# Patient Record
Sex: MALE | Race: WHITE
[De-identification: names, ages, dates, MRNs, and addresses within clinical notes are randomized per-mention and may not be internally consistent; named-entity substitution may affect disease eponyms.]

---

## 2019-08-03 ENCOUNTER — HOSPITAL ENCOUNTER (EMERGENCY)
Dept: HOSPITAL 97 - ER | Age: 54
Discharge: HOME | End: 2019-08-03
Payer: COMMERCIAL

## 2019-08-03 DIAGNOSIS — T63.461A: Primary | ICD-10-CM

## 2019-08-03 DIAGNOSIS — Z88.0: ICD-10-CM

## 2019-08-03 DIAGNOSIS — I10: ICD-10-CM

## 2019-08-03 DIAGNOSIS — L50.9: ICD-10-CM
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
## 2019-08-03 DIAGNOSIS — Z88.6: ICD-10-CM

## 2019-08-03 PROCEDURE — 96374 THER/PROPH/DIAG INJ IV PUSH: CPT

## 2019-08-03 PROCEDURE — 99284 EMERGENCY DEPT VISIT MOD MDM: CPT

## 2019-08-03 PROCEDURE — 96375 TX/PRO/DX INJ NEW DRUG ADDON: CPT

## 2019-08-03 NOTE — EDPHYS
Physician Documentation                                                                           

 [facility]                                                                 

Name: Anoop Silveira Jr                                                                           

Age: 53 yrs                                                                                       

Sex: Male                                                                                         

: 1965                                                                                   

MRN: [id number]                                                                                   

Arrival Date: 2019                                                                          

Time: 10:51                                                                                       

Account#: [id number]                                                                            

Bed 13                                                                                            

Private MD: James Zhang T                                                                        

ED Physician Raj Ramos                                                                         

HPI:                                                                                              

                                                                                             

11:06 This 53 yrs old  Male presents to ER via Ambulatory with complaints of Bee     rn  

      Sting.                                                                                      

11:06 This 53 yrs old  Male presents to ER via Ambulatory with complaints of Wasp    rn  

      Sting.                                                                                      

11:06 The patient presents with itching, rash, chest tightness. Onset: The symptoms/episode   rn  

      began/occurred just prior to arrival. Possible causes: wasp. At home the patient or         

      guardian has treated the symptoms with Benadryl. The patient has experienced similar        

      episodes in the past. Reports allergic to stings, got stung by wasp 20 min PTA, reports     

      rash, itching, and chest tightness, reports epi pen is . .                           

                                                                                                  

Historical:                                                                                       

- Allergies:                                                                                      

16:55 Aspirin;                                                                                sg  

16:55 PENICILLINS;                                                                            sg  

- Home Meds:                                                                                      

16:55 losartan 25 mg Oral tab 1 tab once daily [Active];                                      sg  

- PMHx:                                                                                           

16:55 Asthma; Hypertension;                                                                   sg  

                                                                                                  

- Immunization history:: Adult Immunizations up to date.                                          

- Social history:: Smoking status: .                                                              

- Family history:: not pertinent.                                                                 

- Ebola Screening: : Patient negative for fever greater than or equal to 101.5 degrees            

  Fahrenheit, and additional compatible Ebola Virus Disease symptoms Patient denies               

  exposure to infectious person Patient denies travel to an Ebola-affected area in the            

  21 days before illness onset No symptoms or risks identified at this time.                      

- Hospitalizations: : No recent hospitalization is reported.                                      

                                                                                                  

                                                                                                  

ROS:                                                                                              

11:06 Constitutional: Negative for fever, chills, and weight loss, Eyes: Negative for injury, rn  

      pain, redness, and discharge, Cardiovascular: + chest tightness Respiratory: Negative       

      for cough, wheezing, and pleuritic chest pain, Abdomen/GI: Negative for abdominal pain,     

      nausea, vomiting, diarrhea, and constipation, MS/Extremity: Negative for injury and         

      deformity, Skin: + hives and itching Neuro: Negative for headache, weakness, numbness,      

      tingling, and seizure.                                                                      

                                                                                                  

Exam:                                                                                             

11:06 Constitutional:  This is a well developed, well nourished patient who is awake, alert,  rn  

      and in no acute distress. Head/Face:  Normocephalic, atraumatic. Eyes:  Pupils equal        

      round and reactive to light, extra-ocular motions intact.  Lids and lashes normal.          

      Conjunctiva and sclera are non-icteric and not injected.  Cornea within normal limits.      

      Periorbital areas with no swelling, redness, or edema. ENT:  MMM Respiratory:  No           

      increased work of breathing, no retractions or nasal flaring.  Speaking full sentences      

      Skin:  + diffuse urticaria, no bullae MS/ Extremity:  Pulses equal, no cyanosis.            

      Neurovascular intact.  Full, normal range of motion.  Equal circumference. Neuro:           

      Awake and alert, GCS 15, oriented to person, place, time, and situation.  Cranial           

      nerves II-XII grossly intact.  Motor strength 5/5 in all extremities.  Sensory grossly      

      intact.  Cerebellar exam normal.  Normal gait.                                              

                                                                                                  

Vital Signs:                                                                                      

10:57  / 83; Pulse 90; Resp 18; Temp 97.7(TE); Pulse Ox 96% on R/A; Weight 89.36 kg;    hj  

      Height 6 ft. 2 in. (187.96 cm);                                                             

10:57 Body Mass Index 25.29 (89.36 kg, 187.96 cm)                                             hj  

                                                                                                  

MDM:                                                                                              

11:01 Patient medically screened.                                                             rn  

13:02 Differential diagnosis: angioedema, urticaria. Data reviewed: vital signs, nurses       rn  

      notes, and as a result, I will discharge patient. Counseling: I had a detailed              

      discussion with the patient and/or guardian regarding: the historical points, exam          

      findings, and any diagnostic results supporting the discharge/admit diagnosis, the need     

      for outpatient follow up, to return to the emergency department if symptoms worsen or       

      persist or if there are any questions or concerns that arise at home. Response to           

      treatment: the patient's symptoms have markedly improved after treatment, and as a          

      result, I will discharge patient. Special discussion: I discussed with the                  

      patient/guardian in detail that at this point there is no indication for admission to       

      the hospital. It is understood, however, that if the symptoms persist or worsen the         

      patient needs to return immediately for re-evaluation.                                      

                                                                                                  

                                                                                             

11:06 Order name: IV Start; Complete Time: 11:15                                              rn  

                                                                                                  

Administered Medications:                                                                         

: Drug: SOLU-Medrol 125 mg Route: IVP; Site: right forearm;                               sg  

11:25 Drug: Benadryl 25 mg Route: IVP; Site: right forearm;                                   sg  

11:25 Drug: Pepcid 20 mg Route: IVP; Site: right forearm;                                     sg  

11:25 Drug: Xopenex 1.25 mg Route: Inhalation;                                                sg  

                                                                                                  

                                                                                                  

Disposition:                                                                                      

19 13:03 Discharged to Home. Impression: Toxic effect of venom of wasps, accidental         

  (unintentional), Acute allergic reaction, Urticaria, unspecified.                               

- Condition is Stable.                                                                            

- Discharge Instructions: Hives.                                                                  

- Prescriptions for Prednisone 20 mg Oral Tablet - take 3 tablet by ORAL route once               

  daily for 5 days; 15 tablet. EpiPen 0.3 mg Injection auto- injector - inject 1 pen by           

  INTRAMUSCULAR route one time As needed Inject into the outer portion of the thigh,              

  through clothing if necessary. Indicated in the emergency treatment of allergic                 

  reactions; 1 packet.                                                                            

- Medication Reconciliation Form, Thank You Letter, Antibiotic Education, Prescription            

  Opioid Use form.                                                                                

- Follow up: Private Physician; When: As needed; Reason: Recheck today's complaints,              

  Re-evaluation by your physician.                                                                

- Problem is new.                                                                                 

- Symptoms have improved.                                                                         

                                                                                                  

                                                                                                  

                                                                                                  

Signatures:                                                                                       

Sampson Garcia RN                         RN   sg                                                   

Rja Ramos MD MD   rn                                                   

                                                                                                  

Corrections: (The following items were deleted from the chart)                                    

13:23 13:03 2019 13:03 Discharged to Home. Impression: Toxic effect of venom of wasps,  sg  

      accidental (unintentional); Acute allergic reaction; Urticaria, unspecified. Condition      

      is Stable. Discharge Instructions: Hives. Prescriptions for Prednisone 20 mg Oral           

      Tablet - take 3 tablet by ORAL route once daily for 5 days; 15 tablet, EpiPen 0.3 mg        

      Injection auto-injector - inject 1 pen by INTRAMUSCULAR route one time As needed Inject     

      into the outer portion of the thigh, through clothing if necessary. Indicated in the        

      emergency treatment of allergic reactions; 1 packet. and Forms are Medication               

      Reconciliation Form, Thank You Letter, Antibiotic Education, Prescription Opioid Use.       

      Follow up: Private Physician; When: As needed; Reason: Recheck today's complaints,          

      Re-evaluation by your physician. Problem is new. Symptoms have improved. rn                 

                                                                                                  

**************************************************************************************************

## 2019-08-03 NOTE — ER
Nurse's Notes                                                                                     

 [facility]                                                                 

Name: Anoop Silveira Jr                                                                           

Age: 53 yrs                                                                                       

Sex: Male                                                                                         

: 1965                                                                                   

MRN: [id number]                                                                                   

Arrival Date: 2019                                                                          

Time: 10:51                                                                                       

Account#: [id number]                                                                            

Bed 13                                                                                            

Private MD: James Zhang T                                                                        

Diagnosis: Toxic effect of venom of wasps, accidental (unintentional);Acute allergic              

  reaction;Urticaria, unspecified                                                                 

                                                                                                  

Presentation:                                                                                     

                                                                                             

10:55 Presenting complaint: Patient states: i got sting by a wasp on the L shoulder area and  hj  

      im allergic to it, it happened 20 mins ago, i closed up on my chest before and got          

      itchy; reports SOB;. Transition of care: patient was not received from another setting      

      of care. Onset: The symptoms/episode began/occurred acutely. Onset of symptoms was          

      2019. Risk Assessment: Do you want to hurt yourself or someone else? Patient     

      reports no desire to harm self or others. Initial Sepsis Screen: Does the patient meet      

      any 2 criteria? No. Patient's initial sepsis screen is negative. Does the patient have      

      a suspected source of infection? No. Patient's initial sepsis screen is negative. Care      

      prior to arrival: None.                                                                     

10:55 Method Of Arrival: Ambulatory                                                           hj  

10:55 Acuity: RODRIGUEZ 3                                                                           hj  

10:58 Anaphylaxis evaluation, chest pain.                                                     hj  

                                                                                                  

Historical:                                                                                       

- Allergies:                                                                                      

16:55 Aspirin;                                                                                sg  

16:55 PENICILLINS;                                                                            sg  

- Home Meds:                                                                                      

16:55 losartan 25 mg Oral tab 1 tab once daily [Active];                                      sg  

- PMHx:                                                                                           

16:55 Asthma; Hypertension;                                                                   sg  

                                                                                                  

- Immunization history:: Adult Immunizations up to date.                                          

- Social history:: Smoking status: .                                                              

- Family history:: not pertinent.                                                                 

- Ebola Screening: : Patient negative for fever greater than or equal to 101.5 degrees            

  Fahrenheit, and additional compatible Ebola Virus Disease symptoms Patient denies               

  exposure to infectious person Patient denies travel to an Ebola-affected area in the            

  21 days before illness onset No symptoms or risks identified at this time.                      

- Hospitalizations: : No recent hospitalization is reported.                                      

                                                                                                  

                                                                                                  

Assessment:                                                                                       

11:10 General: Appears in no apparent distress. well groomed, well developed, well nourished, sg  

      Behavior is calm, cooperative, appropriate for age. Pain: Complains of pain in anterior     

      aspect of left shoulder Quality of pain is described as sharp, throbbing, stinging.         

      Neuro: Level of Consciousness is awake, alert, obeys commands, Oriented to person,          

      place, time,  are equal bilaterally Moves all extremities. Full function Gait is       

      steady, Speech is normal, Facial symmetry appears normal. Cardiovascular: Capillary         

      refill is brisk in bilateral fingers Patient's skin is warm and dry. Chest pain is          

      denied. Respiratory: Reports shortness of breath at rest Airway is patent Respiratory       

      effort is even, unlabored, Respiratory pattern is regular, symmetrical, Breath sounds       

      are clear the patient has mild shortness of breath. GI: Abdomen is flat, non-distended,     

      Reports normal bowel habits, tolerance of fluids, tolerance of food, Patient currently      

      denies nausea, pain, vomiting. : No signs and/or symptoms were reported regarding the     

      genitourinary system. EENT: No signs and/or symptoms were reported regarding the EENT       

      system. Derm: Skin is pink, warm \T\ dry. Musculoskeletal: Circulation, motion, and         

      sensation intact. Range of motion: intact in all extremities.                               

                                                                                                  

Vital Signs:                                                                                      

10:57  / 83; Pulse 90; Resp 18; Temp 97.7(TE); Pulse Ox 96% on R/A; Weight 89.36 kg;    hj  

      Height 6 ft. 2 in. (187.96 cm);                                                             

10:57 Body Mass Index 25.29 (89.36 kg, 187.96 cm)                                               

                                                                                                  

ED Course:                                                                                        

10:51 Patient arrived in ED.                                                                  mr  

10:51 James Zhang MD is Private Physician.                                                   mr  

10:57 Triage completed.                                                                       hj  

10:57 Arm band placed on left wrist.                                                          hj  

11:00 Patient has correct armband on for positive identification. Bed in low position. Call   sg  

      light in reach. Side rails up X2. Pulse ox on. NIBP on. Warm blanket given. Head of bed     

      elevated.                                                                                   

11:01 Raj Ramos MD is Attending Physician.                                                rn  

11:14 Sampson Garcia RN is Primary Nurse.                                                       sg  

11:14 Inserted saline lock: 20 gauge in right forearm, using aseptic technique.               ms  

13:20 No provider procedures requiring assistance completed. IV discontinued, intact,         sg  

      bleeding controlled, No redness/swelling at site. Pressure dressing applied.                

                                                                                                  

Administered Medications:                                                                         

11:25 Drug: SOLU-Medrol 125 mg Route: IVP; Site: right forearm;                               sg  

11:25 Drug: Benadryl 25 mg Route: IVP; Site: right forearm;                                     

11:25 Drug: Pepcid 20 mg Route: IVP; Site: right forearm;                                       

11:25 Drug: Xopenex 1.25 mg Route: Inhalation;                                                  

                                                                                                  

                                                                                                  

Outcome:                                                                                          

13:03 Discharge ordered by MD.                                                                rn  

13:20 Discharged to home ambulatory, with family.                                               

13:20 Condition: good                                                                             

13:20 Discharge instructions given to patient, Instructed on discharge instructions, follow       

      up and referral plans. medication usage, safety practices, Demonstrated understanding       

      of instructions, follow-up care, Prescriptions given X 2.                                   

13:23 Patient left the ED.                                                                      

                                                                                                  

Signatures:                                                                                       

Sampson Garcia RN RN sg Rivera, Mary                                 mr Espinoza, Ekaterina                                 ms                                                   

Raj Ramos MD MD rn Joaquin, Henry, RN RN                                                      

                                                                                                  

Corrections: (The following items were deleted from the chart)                                    

10:58 10:57 Pulse 90bpm; Resp 18bpm; Pulse Ox 96% RA; Temp 97.7F Temporal; 89.36 kg; Height 6 hj  

      ft. 2 in.; BMI: 25.2; hj                                                                    

                                                                                                  

**************************************************************************************************

## 2021-07-26 ENCOUNTER — HOSPITAL ENCOUNTER (EMERGENCY)
Dept: HOSPITAL 97 - ER | Age: 56
Discharge: HOME | End: 2021-07-26
Payer: COMMERCIAL

## 2021-07-26 DIAGNOSIS — Z88.0: ICD-10-CM

## 2021-07-26 DIAGNOSIS — Z88.6: ICD-10-CM

## 2021-07-26 DIAGNOSIS — Z91.038: ICD-10-CM

## 2021-07-26 DIAGNOSIS — I10: ICD-10-CM

## 2021-07-26 DIAGNOSIS — T63.461A: Primary | ICD-10-CM

## 2021-07-27 NOTE — ER
Nurse's Notes                                                                                     

 [facility]                                                                 

Name: Anoop Silveira Jr                                                                           

Age: 55 yrs                                                                                       

Sex: Male                                                                                         

: 1965                                                                                   

MRN: [id number]                                                                                   

Arrival Date: 2021                                                                          

Time: 06:47                                                                                       

Account#: [id number]                                                                            

Bed 20                                                                                            

Private MD:                                                                                       

Diagnosis: Insect allergy status;Insect bite (nonvenomous) of other part of head                  

                                                                                                  

Presentation:                                                                                     

                                                                                             

06:55 Chief complaint: Patient states: I was bit by a wasp yesterday and he is itching, and   bb  

      has swelling to throat took benadryl yesterday and this morning. Coronavirus screen: At     

      this time, the client does not indicate any symptoms associated with coronavirus-19.        

      Ebola Screen: No symptoms or risks identified at this time. Initial Sepsis Screen: Does     

      the patient meet any 2 criteria? No. Patient's initial sepsis screen is negative. Does      

      the patient have a suspected source of infection? No. Patient's initial sepsis screen       

      is negative. Risk Assessment: Do you want to hurt yourself or someone else? Patient         

      reports no desire to harm self or others. Onset of symptoms was 2021.              

06:55 Method Of Arrival: Ambulatory                                                           bb  

06:55 Acuity: RODRIGUEZ 3                                                                           bb  

                                                                                                  

Historical:                                                                                       

- Allergies:                                                                                      

08:09 Aspirin;                                                                                ap3 

08:09 PENICILLINS;                                                                            ap3 

- Home Meds:                                                                                      

08:09 losartan 25 mg Oral tab 1 tab once daily [Active];                                      ap3 

- PMHx:                                                                                           

08:09 Asthma; Hypertension;                                                                   ap3 

- PSHx:                                                                                           

08:09 left index finger partial amputation;                                                   ap3 

                                                                                                  

- Immunization history:: Adult Immunizations up to date.                                          

- Social history:: Smoking status: Patient denies any tobacco usage or history of.                

                                                                                                  

                                                                                                  

Screenin:09 Abuse screen: Denies threats or abuse. Nutritional screening: No deficits noted.        ap3 

      Tuberculosis screening: No symptoms or risk factors identified. Fall Risk None              

      identified.                                                                                 

                                                                                                  

Assessment:                                                                                       

07:05 General: Appears in no apparent distress. comfortable, Behavior is calm, cooperative,   ap3 

      appropriate for age. Pain: Complains of pain in neck. Neuro: Level of Consciousness is      

      awake, alert, obeys commands, Oriented to person, place, time, situation, Appropriate       

      for age Moves all extremities. Gait is steady, Speech is normal. Cardiovascular: Denies     

      chest pain. Respiratory: Airway is patent Respiratory effort is even, unlabored,            

      Respiratory pattern is regular, symmetrical, swelling noted on the left side of the         

      throat. Breath sounds are clear bilaterally. GI: No signs and/or symptoms were reported     

      involving the gastrointestinal system. : No signs and/or symptoms were reported           

      regarding the genitourinary system. EENT: Throat is clear Denies difficulty swallowing.     

      Musculoskeletal: Swelling present in left jaw and neck.                                     

                                                                                                  

Vital Signs:                                                                                      

06:55  / 86; Pulse 96; Resp 16 S; Temp 99.9(O); Pulse Ox 98% on R/A; Weight 95.25 kg    bb  

      (R); Height 6 ft. 2 in. (187.96 cm) (R); Pain 0/10;                                         

08:10  / 84; Pulse 83; Resp 17; Pulse Ox 100% on R/A;                                   ap3 

06:55 Body Mass Index 26.96 (95.25 kg, 187.96 cm)                                             bb  

                                                                                                  

ED Course:                                                                                        

06:47 Patient arrived in ED.                                                                  ds1 

06:57 Triage completed.                                                                       bb  

06:57 [facility]                                                        kb  

06:57 Raj Ramos MD is Attending Physician.                                                kb  

07:09 Luis Manuel Stanley, RN is Primary Nurse.                                                     jb4 

07:10 Primary Nurse role handed off by Luis Manuel Stanley, RN                                      ap3 

07:10 Greer White, RN is Primary Nurse.                                                  ap3 

08:09 Arm band placed on right wrist.                                                         ap3 

08:09 Patient has correct armband on for positive identification. Bed in low position. Call   ap3 

      light in reach. Side rails up X 1. Pulse ox on. NIBP on. Door closed. Noise minimized.      

08:45 No provider procedures requiring assistance completed. IV discontinued, intact,         ap3 

      bleeding controlled, No redness/swelling at site. Pressure dressing applied.                

                                                                                                  

Administered Medications:                                                                         

07:28 Drug: Benadryl (diphenhydrAMINE) 12.5 mg Route: IVP; Site: right antecubital;           ap3 

08:45 Follow up: Response: No adverse reaction                                                ap3 

07:29 Drug: SOLU-Medrol (methylPrednisoLONE) 125 mg Route: IVP; Site: right antecubital;      ap3 

07:29 Drug: NS 0.9% 1000 ml Route: IV; Rate: 1000 ml; Site: right antecubital;                ap3 

08:45 Follow up: IV Status: Completed infusion; IV Intake: 1000ml                             ap3 

07:29 Drug: Pepcid (famotidine) 20 mg Route: IVP; Site: right antecubital;                    ap3 

08:46 Follow up: Response: No adverse reaction                                                ap3 

                                                                                                  

                                                                                                  

Intake:                                                                                           

08:45 IV: 1000ml; Total: 1000ml.                                                              ap3 

                                                                                                  

Outcome:                                                                                          

08:23 Discharge ordered by MD. tyler  

08:45 Discharged to home ambulatory.                                                          ap3 

08:45 Condition: good                                                                             

08:45 Discharge instructions given to patient, Instructed on discharge instructions, follow       

      up and referral plans. medication usage, Demonstrated understanding of instructions,        

      follow-up care, medications, Prescriptions given X 2.                                       

08:46 Patient left the ED.                                                                    ap3 

                                                                                                  

Signatures:                                                                                       

Amanda Brar, FNP-C                 FNP-Neris Garcia                                ds1                                                  

Natalie Ramesh, RN                     RN   bb                                                   

Luis Manuel Stanley RN                       RN   jb4                                                  

Greer White RN                    RN   ap3                                                  

                                                                                                  

**************************************************************************************************

## 2021-07-27 NOTE — EDPHYS
Physician Documentation                                                                           

 [facility]                                                                 

Name: Anoop Silveira Jr                                                                           

Age: 55 yrs                                                                                       

Sex: Male                                                                                         

: 1965                                                                                   

MRN: [id number]                                                                                   

Arrival Date: 2021                                                                          

Time: 06:47                                                                                       

Account#: [id number]                                                                            

Bed 20                                                                                            

Private MD:                                                                                       

ED Physician Raj Ramos                                                                         

HPI:                                                                                              

                                                                                             

08:21 This 55 yrs old  Male presents to ER via Ambulatory with complaints of Wasp    kb  

      Sting-Allergic Reaction.                                                                    

08:21 The patient was bitten on the left jaw, by a wasp, while in the garden or yard, at        

      home. Onset: The symptoms/episode began/occurred yesterday, at 13:30. Secondary to the      

      bite the patient reports swelling. Associated signs and symptoms: Pertinent positives:      

      swelling at site. Severity of symptoms: At their worst the symptoms were mild,              

      moderate, in the emergency department the symptoms are unchanged. The patient has           

      experienced a previous episode. The patient has not recently seen a physician. Patient      

      reports he was stung by wasp in the yard yesterday around 1:30 PM. Patient had              

      localized swelling where he was stung on the left jaw, took 4 Benadryl yesterday. Today     

      woke up with swelling to left jaw that moves down neck and itching..                        

                                                                                                  

Historical:                                                                                       

- Allergies:                                                                                      

08:09 Aspirin;                                                                                ap3 

08:09 PENICILLINS;                                                                            ap3 

- Home Meds:                                                                                      

08:09 losartan 25 mg Oral tab 1 tab once daily [Active];                                      ap3 

- PMHx:                                                                                           

08:09 Asthma; Hypertension;                                                                   ap3 

- PSHx:                                                                                           

08:09 left index finger partial amputation;                                                   ap3 

                                                                                                  

- Immunization history:: Adult Immunizations up to date.                                          

- Social history:: Smoking status: Patient denies any tobacco usage or history of.                

                                                                                                  

                                                                                                  

ROS:                                                                                              

08:20 Constitutional: Negative for fever, chills, and weight loss.                            kb  

08:20 Skin: Positive for swelling, of the left jaw and neck.                                      

08:20 All other systems are negative.                                                             

                                                                                                  

Exam:                                                                                             

08:20 Constitutional:  This is a well developed, well nourished patient who is awake, alert,  kb  

      and in no acute distress. Head/Face:  Normocephalic, atraumatic. ENT:  Moist Mucous         

      membranes Cardiovascular:  Regular rate and rhythm with a normal S1 and S2.  No             

      gallops, murmurs, or rubs.  No pulse deficits. Respiratory:  Respirations even and          

      unlabored. No increased work of breathing, no retractions or nasal flaring. MS/             

      Extremity:  Pulses equal, no cyanosis.  Neurovascular intact.  Full, normal range of        

      motion. Neuro:  Awake and alert, GCS 15, oriented to person, place, time, and               

      situation. Moves all extremities. Normal gait. Psych:  Awake, alert, with orientation       

      to person, place and time.  Behavior, mood, and affect are within normal limits.            

08:20 Skin: Appearance: normal except for affected area, swelling, noted on the left jaw and      

      neck, that are mild.                                                                        

                                                                                                  

Vital Signs:                                                                                      

06:55  / 86; Pulse 96; Resp 16 S; Temp 99.9(O); Pulse Ox 98% on R/A; Weight 95.25 kg    bb  

      (R); Height 6 ft. 2 in. (187.96 cm) (R); Pain 0/10;                                         

08:10  / 84; Pulse 83; Resp 17; Pulse Ox 100% on R/A;                                   ap3 

06:55 Body Mass Index 26.96 (95.25 kg, 187.96 cm)                                             bb  

                                                                                                  

MDM:                                                                                              

06:57 Patient medically screened.                                                             kb  

08:20 Data reviewed: vital signs, nurses notes. Data interpreted: Pulse oximetry: on room air kb  

      is 100 %. Interpretation: normal. Counseling: I had a detailed discussion with the          

      patient and/or guardian regarding: the historical points, exam findings, and any            

      diagnostic results supporting the discharge/admit diagnosis, the need for outpatient        

      follow up, a family practitioner, to return to the emergency department if symptoms         

      worsen or persist or if there are any questions or concerns that arise at home.             

      Response to treatment: the patient's symptoms have resolved after treatment.                

                                                                                                  

                                                                                             

06:58 Order name: IV Start; Complete Time: 07:09                                              kb  

                                                                                                  

Administered Medications:                                                                         

07:28 Drug: Benadryl (diphenhydrAMINE) 12.5 mg Route: IVP; Site: right antecubital;           ap3 

08:45 Follow up: Response: No adverse reaction                                                ap3 

07:29 Drug: SOLU-Medrol (methylPrednisoLONE) 125 mg Route: IVP; Site: right antecubital;      ap3 

07:29 Drug: NS 0.9% 1000 ml Route: IV; Rate: 1000 ml; Site: right antecubital;                ap3 

08:45 Follow up: IV Status: Completed infusion; IV Intake: 1000ml                             ap3 

07:29 Drug: Pepcid (famotidine) 20 mg Route: IVP; Site: right antecubital;                    ap3 

08:46 Follow up: Response: No adverse reaction                                                ap3 

                                                                                                  

                                                                                                  

Disposition:                                                                                      

11:49 Co-signature as Attending Physician, Raj Ramos MD.                                    rn  

                                                                                                  

Disposition Summary:                                                                              

21 08:23                                                                                    

Discharge Ordered                                                                                 

      Location: Home                                                                          kb  

      Condition: Stable                                                                       kb  

      Diagnosis                                                                                   

        - Insect allergy status                                                               kb  

        - Insect bite (nonvenomous) of other part of head                                     kb  

      Followup:                                                                               kb  

        - With: Emergency Department                                                               

        - When: As needed                                                                          

        - Reason: Worsening of condition                                                           

      Followup:                                                                               kb  

        - With: Private Physician                                                                  

        - When: 2 - 3 days                                                                         

        - Reason: Recheck today's complaints, Continuance of care, Re-evaluation by your           

      physician                                                                                   

      Discharge Instructions:                                                                     

        - Discharge Summary Sheet                                                             kb  

        - Bee, Wasp, or Hornet Sting, Adult                                                   kb  

      Forms:                                                                                      

        - Medication Reconciliation Form                                                      kb  

        - Thank You Letter                                                                    kb  

        - Antibiotic Education                                                                kb  

        - Prescription Opioid Use                                                             kb  

      Prescriptions:                                                                              

        - Pepcid 20 mg Oral Tablet                                                                 

            - take 1 tablet by ORAL route every 12 hours for 5 days; 10 tablet; Refills: 0,   kb  

      Product Selection Permitted                                                                 

        - Prednisone 20 mg Oral Tablet                                                             

            - take 1 tablet by ORAL route once daily for 5 days; 5 tablet; Refills: 0,        kb  

      Product Selection Permitted                                                                 

Signatures:                                                                                       

Amanda Brar FNP-C FNP-Raj Johnson MD MD rn Prokisch, Amanda, RN                    RN   ap3                                                  

                                                                                                  

**************************************************************************************************

## 2021-07-28 VITALS — TEMPERATURE: 99.9 F

## 2021-07-28 VITALS — OXYGEN SATURATION: 100 % | DIASTOLIC BLOOD PRESSURE: 84 MMHG | SYSTOLIC BLOOD PRESSURE: 123 MMHG

## 2022-05-13 ENCOUNTER — HOSPITAL ENCOUNTER (EMERGENCY)
Dept: HOSPITAL 97 - ER | Age: 57
Discharge: HOME | End: 2022-05-13
Payer: COMMERCIAL

## 2022-05-13 DIAGNOSIS — Z20.822: ICD-10-CM

## 2022-05-13 DIAGNOSIS — J45.909: ICD-10-CM

## 2022-05-13 DIAGNOSIS — Z88.6: ICD-10-CM

## 2022-05-13 DIAGNOSIS — Z88.0: ICD-10-CM

## 2022-05-13 DIAGNOSIS — I10: ICD-10-CM

## 2022-05-13 DIAGNOSIS — J20.9: Primary | ICD-10-CM

## 2022-05-13 LAB
ALBUMIN SERPL BCP-MCNC: 3.2 G/DL (ref 3.4–5)
ALP SERPL-CCNC: 75 U/L (ref 45–117)
ALT SERPL W P-5'-P-CCNC: 144 U/L (ref 12–78)
AST SERPL W P-5'-P-CCNC: 96 U/L (ref 15–37)
BUN BLD-MCNC: 13 MG/DL (ref 7–18)
GLUCOSE SERPLBLD-MCNC: 99 MG/DL (ref 74–106)
HCT VFR BLD CALC: 42.7 % (ref 39.6–49)
INR BLD: 1.13
LYMPHOCYTES # SPEC AUTO: 0.6 K/UL (ref 0.7–4.9)
MAGNESIUM SERPL-MCNC: 1.7 MG/DL (ref 1.8–2.4)
NT-PROBNP SERPL-MCNC: 17 PG/ML (ref ?–125)
PMV BLD: 7.4 FL (ref 7.6–11.3)
POTASSIUM SERPL-SCNC: 3.7 MMOL/L (ref 3.5–5.1)
RBC # BLD: 4.26 M/UL (ref 4.33–5.43)
TROPONIN I SERPL HS-MCNC: 5 PG/ML (ref ?–58.9)

## 2022-05-13 PROCEDURE — 87804 INFLUENZA ASSAY W/OPTIC: CPT

## 2022-05-13 PROCEDURE — 93005 ELECTROCARDIOGRAM TRACING: CPT

## 2022-05-13 PROCEDURE — 76705 ECHO EXAM OF ABDOMEN: CPT

## 2022-05-13 PROCEDURE — 36415 COLL VENOUS BLD VENIPUNCTURE: CPT

## 2022-05-13 PROCEDURE — 80048 BASIC METABOLIC PNL TOTAL CA: CPT

## 2022-05-13 PROCEDURE — 96361 HYDRATE IV INFUSION ADD-ON: CPT

## 2022-05-13 PROCEDURE — 84484 ASSAY OF TROPONIN QUANT: CPT

## 2022-05-13 PROCEDURE — 85025 COMPLETE CBC W/AUTO DIFF WBC: CPT

## 2022-05-13 PROCEDURE — 80076 HEPATIC FUNCTION PANEL: CPT

## 2022-05-13 PROCEDURE — 99285 EMERGENCY DEPT VISIT HI MDM: CPT

## 2022-05-13 PROCEDURE — 96365 THER/PROPH/DIAG IV INF INIT: CPT

## 2022-05-13 PROCEDURE — 96375 TX/PRO/DX INJ NEW DRUG ADDON: CPT

## 2022-05-13 PROCEDURE — 83880 ASSAY OF NATRIURETIC PEPTIDE: CPT

## 2022-05-13 PROCEDURE — 71275 CT ANGIOGRAPHY CHEST: CPT

## 2022-05-13 PROCEDURE — 83735 ASSAY OF MAGNESIUM: CPT

## 2022-05-13 PROCEDURE — 85379 FIBRIN DEGRADATION QUANT: CPT

## 2022-05-13 PROCEDURE — 71045 X-RAY EXAM CHEST 1 VIEW: CPT

## 2022-05-13 PROCEDURE — 85610 PROTHROMBIN TIME: CPT

## 2022-05-13 NOTE — RAD REPORT
EXAM DESCRIPTION:  CT - Chest For Pe Angio - 5/13/2022 8:54 pm

 

CLINICAL HISTORY:  Chest pain.

shortness of breath

 

COMPARISON:  Thorax W/ Con dated 3/1/2022

 

TECHNIQUE:  CT angiogram of the pulmonary arteries was performed with MIP.

 

All CT scans are performed using dose optimization technique as appropriate and may include automated
 exposure control or mA/KV adjustment according to patient size.

 

FINDINGS:  No evidence of pulmonary thromboembolism.

 

No acute aortic finding demonstrated.

 

Linear atelectasis is present in the posterior lung bases. The lungs are otherwise clear.

 

No significant pericardial or pleural fluid.

 

No concerning bony finding.

 

IMPRESSION:  No evidence of pulmonary thromboembolism.

 

Atelectasis is present both lung bases.

## 2022-05-13 NOTE — XMS REPORT
Clinical Summary

                             Created on:May 13, 2022



Patient:Anoop Silveira Jr.

Sex:Male

:1965

External Reference #:[id number]





Demographics







                          Address                   [address]



                                                    [place] [address]

 

                          Home Phone                [phone]

 

                          Mobile Phone              [phone]

 

                          Email Address             [email or web address]

 

                          Preferred Language        English

 

                          Marital Status            

 

                          [employer] Affiliation     Unknown

 

                          Race                      White

 

                          Ethnic Group              Not  or 









Author







                          Organization              [facility]

[facility]

 

                          Address                   [address] [place] [address]









Support







                Name            Relationship    Address         Phone

 

                Lisbeth Silveira Unavailable     [address]    [phone]

[address] [place] [address] 









Care Team Providers







                    Name                Role                Phone

 

                    VARUN Bernard MD       Primary Care Provider [phone]









Allergies







             Active Allergy Reactions    Severity     Noted Date   Comments

 

             Amoxicillin  Anaphylaxis  High         2019   

 

             Aspirin      Anaphylaxis  High         2018   

 

             Penicillins  Anaphylaxis  High         2019   

 

             Tramadol     Itching                   2019   







Medications







          Medication Sig       Dispensed Refills   Start Date End Date  Status

 

          losartan (COZAAR) 100 Take 100 mg by           0                      

       Active



          mg tablet mouth daily.                                         

 

          zolpidem (AMBIEN) 10 mg Take 12.5 mg by           0                   

          Active



          tablet    mouth at bedtime.                                         



                                                                      

 

          mometasone-formoterol Inhale 200 mg by           0                    

         Active



          (DULERA) 200-5 mouth twice                                         



          mcg/actuation HFAA daily.                                            

 

          multivitamin-iron- Chew 1 tablet           0                     

        Active



          als-folic acid chewable daily.                                        

    



          tablet 3,500 units-18                                                 

  



          mg-0.4 mg                                                   

 

          doxycycline Take 100 mg by           0                             Act

chase



          (VIBRAMYCIN) 150 mg mouth daily.                                      

   



          tablet                                                      

 

          sildenafil (REVATIO) 20 Take by mouth.           0                    

         Active



          mg tablet                                                   







Active Problems







                          Problem                   Noted Date

 

                          Adenocarcinoma of prostate 2019









                                        Overview: 



Formatting of this note might be different from the original.



                                        Added automatically from request for jaja redman [id number]









                          Prostate cancer           2018









                                        Cancer Staging: Clinical stage from : Stage IIB (cT1c, cN0, cM0, PSA: 

8.4, Grade Group: 2) - Signed by Bessy Zapata NP on 2019

 

                                        Overview: 



Formatting of this note might be different from the original.



                                        Mandatory CMS ICD-10  UPDATE



                                        



Last Assessment & Plan: 



Formatting of this note might be different from the original.



                                        Dr. Bernard recommends transperineal biops

y, given the elevation in PSA and that 

he has not had a confirmatory biopsy here at [facility]. I will send a [facility] 
message to the patient as we had discussed in his appointment.









                          Elevated prostate specific antigen (PSA) 2014









                                        Last Assessment & Plan: 



Formatting of this note might be different from the original.



                                         PSA today is increased to 10.3 ng/mL.







Encounters







             Date         Type         Specialty    Care Team    Description

 

             2021   Telephone    Urology      Erica Evangelista, RN 



after 2021



Surgical History







                Surgery         Date            Site/Laterality Comments

 

                UMBILICAL HERNIA REPAIR                                 

 

                COLONOSCOPY W/ POLYPECTOMY                                 3 yea

rs ago

 

                OH BIOPSY OF    2019       Perineum/N/A    Procedure: ENOVA

RE;



                PROSTATE,NEEDLE,TRANSPERINEAL                                 TR

ANSPERINEAL NEEDLE BIOPSY OF



                                                                PROSTATE AND SAT

URATION



                                                                SAMPLING USING S

TEREOTACTIC



                                                                TEMPLATE GUIDKT

E;  Surgeon:



                                                                MEREDITH Lee;  Location:



                                                                [facility];  Health system

e: UROLOGY

 

                OH              2019       Abdomen/N/A     Procedure: SP RO

BOTIC ASSISTED



                LAP,PROSTATECTOMY,RADICAL,W/N                                 OH

OSTATECTOMY, RETROPUBIC



                ERVE SPARE,INCL ROBOTIC                                 RADICAL,

 INCLUDING NERVE



                                                                SPARING;  Surgeo

n: Tye Bernard MD;  Loca

tion: MAIN OR;



                                                                 Service: UROLOG

Y







Medical History







                    Medical History     Date                Comments

 

                    Hypertension                            

 

                    Uncomplicated asthma                     

 

                    Prostate cancer     2018           







Family History







                Medical History Relation        Name            Comments

 

                -Genitourinary (Bladder, Maternal Grandfather Grandfather     



                Kidney, Prostate,                                 



                Testicle)                                       

 

                Coronary heart disease Maternal Grandfather Grandfather     



                (CHD)                                           

 

                Prostate cancer Maternal Grandfather Grandfather     lived to be

 60 or



                                                                70, unsure if he



                                                                 from prosta

te



                                                                cancer or other



                                                                causes

 

                Diabetes        Mother          Mother          

 

                Hypertension    Mother          Mother          

 

                Stroke          Mother          Mother          

 

                -Gastrointestinal Paternal Grandmother Great Grandmother 



                (Esophagus, Liver, Bile                                 



                Duct, Stomach, Pancreas,                                 



                Colon, Rectum, Anus                                 









                Relation        Name            Status          Comments

 

                Maternal Grandfather Grandfather                     

 

                Mother          Mother                          

 

                Paternal Grandmother Great Grandmother                 







Social History







             Tobacco Use  Types        Packs/Day    Years Used   Date

 

             Never Smoker                                        









                Smokeless Tobacco: Never Used                                 









                    Alcohol Use         Standard Drinks/Week Comments

 

                    Yes                 0 (1 standard drink = 0.6 oz pure alcoho

l) 









                          Sex Assigned at Birth     Date Recorded

 

                          Not on file               









                    Job Start Date      Occupation          Industry

 

                    Not on file         Not on file         Not on file







Obstetrics History





Last Filed Vital Signs

Not on file



Plan of Treatment







                Health Maintenance Due Date        Last Done       Comments

 

                COVID-19 Vaccination (2 - Pfizer risk 4-dose series) 04/10/2021 

     2021      







Results

Not on fileafter 2021



Insurance







          Payer     Benefit Plan / Subscriber ID Effective Dates Phone     Addre

ss   Type



                    Group                                             

 

           [employer] [place] PPO POS [id number] 2015-Present            P

[id number]



                                        PPO



          [place], [place] 



                                                            [id number]     









           Guarantor Name Account Type Relation to Date of    Phone      Billing



                                 Patient    Birth                 Address

 

           Anoop Silveira Personal/Family Self       1965 [phone]

[address]                                         (Home)     [place] [address]

 

           Anoop Silveira Personal/Family Self       1965 [phone]

[address]                                         (Home)     [place] [address]

 

           Anoop Silveira Personal/Family Self       1965 [phone]

[address]                                         (Home)     [place] [address]







Advance Directives







                Code Status     Date Activated  Date Inactivated Comments

 

                Full Code       2019  9:16 PM 2019  2:10 PM 







Care Teams







             Team Member  Relationship Specialty    Start Date   End Date

 

                                        Tye Bernard MD



                PCP - General   Urology         8/10/18         



                                        [address] [place] [id number]



                                                                



                                        [phone] (Work)



                                                                



             [phone] (Fax)

## 2022-05-13 NOTE — EDPHYS
Physician Documentation                                                                           

 [facility]                                                                 

Name: Anoop Silveira Jr                                                                           

Age: 56 yrs                                                                                       

Sex: Male                                                                                         

: 1965                                                                                   

MRN: [id number]                                                                                   

Arrival Date: 2022                                                                          

Time: 18:56                                                                                       

Account#: [id number]                                                                            

Bed 15                                                                                            

Private MD:                                                                                       

ED Physician Rod Ascencio                                                                       

HPI:                                                                                              

                                                                                             

19:30 This 56 yrs old Male presents to ER via Ambulatory with complaints of Shortness Of      cp  

      Breath, Back Pain.                                                                          

19:30 The patient has shortness of breath at rest.                                            cp  

19:30 Onset: The symptoms/episode began/occurred 2 week(s) ago.                               cp  

19:30 Duration: The symptoms are continuous, and are steadily getting worse. Associated signs cp  

      and symptoms: Pertinent positives: non-productive cough, shortness of breath, Pertinent     

      negatives: chest pain, fever.                                                               

19:30 Severity of symptoms: in the emergency department the symptoms are unchanged despite    cp  

      home interventions.                                                                         

                                                                                                  

Historical:                                                                                       

- Allergies:                                                                                      

19:05 Aspirin;                                                                                vg1 

19:05 PENICILLINS;                                                                            vg1 

- Home Meds:                                                                                      

19:05 losartan 25 mg Oral tab 1 tab once daily [Active]; Ambian [Active];                     vg1 

- PMHx:                                                                                           

19:05 Asthma; Hypertension; Bronchitis;                                                       vg1 

- PSHx:                                                                                           

19:05 left index finger partial amputation;                                                   vg1 

                                                                                                  

- Immunization history:: Client reports receiving the 2nd dose of the Covid vaccine.              

- Social history:: Smoking status: Patient denies any tobacco usage or history of.                

                                                                                                  

                                                                                                  

ROS:                                                                                              

19:35 Constitutional: Negative for fever, poor PO intake.                                     cp  

19:35 Cardiovascular: Negative for chest pain, edema, palpitations.                           cp  

19:35 Respiratory: Positive for cough, with no reported sputum, shortness of breath, at rest.     

      Negative for wheezing.                                                                      

19:35 Abdomen/GI: Negative for abdominal pain, vomiting, diarrhea, constipation.                  

19:35 Eyes: Negative for injury, pain, redness, and discharge.                                cp  

19:35 ENT: Negative for drainage from ear(s), ear pain, sore throat, difficulty swallowing,       

      difficulty handling secretions.                                                             

19:35 : Negative for urinary symptoms.                                                          

19:35 Skin: Negative for rash.                                                                    

19:35 Neuro: Negative for altered mental status, dizziness, headache, syncope, weakness.          

19:35 All other systems are negative.                                                             

                                                                                                  

Exam:                                                                                             

19:40 Constitutional: The patient appears in no acute distress, alert, awake,                 cp  

      non-diaphoretic, non-toxic, well developed, well nourished.                                 

19:40 Head/Face:  Normocephalic, atraumatic.                                                  cp  

19:40 Eyes: Periorbital structures: appear normal, Conjunctiva: normal, no exudate, no            

      injection, Sclera: no appreciated abnormality, Lids and lashes: appear normal,              

      bilaterally.                                                                                

19:40 ENT: External ear(s): are unremarkable, Nose: is normal, Mouth: Lips: moist, Oral           

      mucosa: pink and intact, moist, Posterior pharynx: Airway: no evidence of obstruction,      

      patent.                                                                                     

19:40 Neck: ROM/movement: is normal, is supple, without pain, no range of motions                 

      limitations, no meningismus.                                                                

19:40 Chest/axilla: Inspection: normal, Palpation: is normal, no crepitus, no tenderness.         

19:40 Cardiovascular: Rate: tachycardic, Rhythm: regular, Edema: is not appreciated, JVD: is      

      not appreciated.                                                                            

19:40 Respiratory: the patient does not display signs of respiratory distress,  Respirations:     

      labored breathing, that is mild, intercostal retractions, are absent, Breath sounds:        

      decreased breath sounds, that are mild, diffuse, stridor, is not appreciated, wheezing:     

      that is mild, is heard diffusely.                                                           

19:40 Abdomen/GI: Inspection: abdomen appears normal, Bowel sounds: active, all quadrants,        

      Palpation: abdomen is soft and non-tender, in all quadrants.                                

19:40 Back: pain, is absent, ROM is normal.                                                       

19:40 Neuro: Orientation: to person, place \T\ time. Mentation: is normal, Motor: moves all       

      fours, strength is normal, Sensation: is normal.                                            

19:48 ECG was reviewed by the Attending Physician.                                            cp  

                                                                                                  

Vital Signs:                                                                                      

19:03  / 81; Pulse 120; Resp 24; Temp 98.5(O); Pulse Ox 98% on R/A; Weight 99.79 kg;    vg1 

      Height 6 ft. 2 in. (187.96 cm); Pain 3/10;                                                  

20:00  / 79; Pulse 101; Resp 20; Pulse Ox 99% on R/A;                                   jb4 

21:15  / 67; Pulse 111; Resp 19; Pulse Ox 96% on R/A;                                   jb4 

19:03 Body Mass Index 28.25 (99.79 kg, 187.96 cm)                                             vg1 

                                                                                                  

MDM:                                                                                              

19:20 Patient medically screened.                                                             cp  

20:00 Differential diagnosis: Bronchitis CHF exacerbation, Myocardial Infarction pneumonia,   cp  

      Pneumothorax pulmonary edema, Pulmonary Embolism Unstable Angina.                           

22:10 Data reviewed: vital signs, nurses notes, lab test result(s), EKG, radiologic studies,  cp  

      CT scan, plain films, ultrasound.                                                           

22:10 Test interpretation: by ED physician or midlevel provider: ECG, plain radiologic        cp  

      studies. Counseling: I had a detailed discussion with the patient and/or guardian           

      regarding: the historical points, exam findings, and any diagnostic results supporting      

      the discharge/admit diagnosis, lab results, radiology results, the need for outpatient      

      follow up, a family practitioner, to return to the emergency department if symptoms         

      worsen or persist or if there are any questions or concerns that arise at home.             

      Response to treatment: the patient's symptoms have markedly improved after treatment,       

      and as a result, I will discharge patient.                                                  

                                                                                                  

                                                                                             

19:27 Order name: Basic Metabolic Panel; Complete Time: 20:36                                 cp  

                                                                                             

21:25 Interpretation: Normal except: .                                                  cp  

                                                                                             

19:27 Order name: CBC with Diff; Complete Time: 20:36                                         cp  

                                                                                             

21:26 Interpretation: Normal except: RBC 4.26; .1; MCH 35.2; MPV 7.4; CHRISTOFER% 73.9; LYM%  cp  

      9.5; MN% 15.5; LYMA 0.6.                                                                    

                                                                                             

19:27 Order name: D-Dimer; Complete Time: 20:42                                               cp  

                                                                                             

19: Order name: LFT's; Complete Time: 20:36                                                 cp  

                                                                                             

21:26 Interpretation: Normal except: AST 96; ; BILID 0.3; ALB 3.2; GLOB 4.2; A/G 0.8.  cp  

                                                                                             

19:27 Order name: Magnesium; Complete Time: 20:36                                             cp  

                                                                                             

21:26 Interpretation: MG 1.7; Reviewed.                                                         

                                                                                             

19:27 Order name: NT PRO-BNP; Complete Time: 20:36                                            cp  

                                                                                             

21:26 Interpretation: NT PRO-BNP 17; Reviewed.                                                  

19: Order name: PT-INR; Complete Time: 20:42                                                cp  

                                                                                             

19:27 Order name: Troponin HS; Complete Time: 20:36                                           cp  

                                                                                             

19:27 Order name: XRAY Chest (1 view); Complete Time: 20:06                                     

                                                                                             

20:07 Interpretation: Report review.                                                            

                                                                                             

19:27 Order name: COVID-19 SARS RT PCR (Document "Date of Onset" if Symptomatic); Complete    cp  

      Time: 21:25                                                                                 

                                                                                             

21:25 Interpretation: SARSCOV2 RT PCR NEGATIVE; Reviewed.                                       

                                                                                             

19:27 Order name: Influenza Screen (a \T\ B); Complete Time: 20:36                              cp

                                                                                             

20:42 Order name: CT Chest For PE Angio; Complete Time: 21:25                                 cp  

                                                                                             

21:25 Interpretation: Report reviewed.                                                          

                                                                                             

20:43 Order name: US Abdomen Limited; Complete Time: 21:42                                    cp  

                                                                                             

21:42 Interpretation: Report reviewed.                                                          

                                                                                             

19:27 Order name: EKG; Complete Time: 19:28                                                   cp  

                                                                                             

19:27 Order name: Cardiac monitoring; Complete Time: 19:55                                    cp  

                                                                                             

19:27 Order name: EKG - Nurse/Tech; Complete Time: 19:55                                      cp  

                                                                                             

19:27 Order name: IV Saline Lock; Complete Time: 19:55                                        cp  

                                                                                             

19:27 Order name: Labs collected and sent; Complete Time: 19:55                               cp  

                                                                                             

19:27 Order name: O2 Per Protocol; Complete Time: 19:55                                       cp  

                                                                                             

19:27 Order name: O2 Sat Monitoring; Complete Time: 19:55                                     cp  

                                                                                                  

EC:48 Rate is 105 beats/min. Rhythm is regular. UT interval is normal. QRS interval is        cp  

      normal. QT interval is normal. T waves are Inverted in leads III, aVR. Interpreted by       

      me. Reviewed by me.                                                                         

                                                                                                  

Administered Medications:                                                                         

19:55 Drug: Xopenex (levalbuterol) (3) 1.25 mg Route: Inhalation;                             jb4 

19:55 Drug: SOLU-Medrol (methylPrednisoLONE) 125 mg Route: IVP; Site: right wrist;            jb4 

21:00 Follow up: Response: No adverse reaction                                                jb4 

20:17 Drug: NS 0.9% 1000 ml Route: IV; Rate: 500 ml/hr; Site: right wrist;                    sm5 

22:17 Follow up: Response: No adverse reaction; IV Status: Completed infusion; IV Intake:     jb4 

      1000ml                                                                                      

20:17 Drug: Zofran (Ondansetron) 4 mg Route: IVP; Site: right wrist;                          5 

22:41 Follow up: Response: No adverse reaction                                                jb4 

21:17 Drug: Magnesium Sulfate 1 grams Route: IVPB; Infused Over: 1 hrs; Site: right forearm;  jb4 

22:17 Follow up: Response: No adverse reaction; IV Status: Completed infusion; IV Intake:     jb4 

      100ml                                                                                       

                                                                                                  

                                                                                                  

Disposition Summary:                                                                              

22 22:14                                                                                    

Discharge Ordered                                                                                 

      Location: Home                                                                          cp  

      Problem: new                                                                            cp  

      Symptoms: have improved                                                                 cp  

      Condition: Stable                                                                       cp  

      Diagnosis                                                                                   

        - Acute bronchitis, unspecified                                                       cp  

      Followup:                                                                               cp  

        - With: Private Physician                                                                  

        - When: 2 - 3 days                                                                         

        - Reason: Recheck today's complaints                                                       

      Discharge Instructions:                                                                     

        - Discharge Summary Sheet                                                             cp  

        - Acute Bronchitis, Adult                                                             cp  

      Forms:                                                                                      

        - Medication Reconciliation Form                                                      cp  

        - Thank You Letter                                                                    cp  

        - Antibiotic Education                                                                cp  

        - Prescription Opioid Use                                                             cp  

      Prescriptions:                                                                              

        - Albuterol Sulfate 2.5 mg /3 mL (0.083 %) Inhalation Solution for Nebulization            

            - inhale 1 unit by NEBULIZATION route every 8 hours As needed; 1 box; Refills: 0, cp  

      Product Selection Permitted                                                                 

        - Zithromax Z-Jerome 250 mg Oral Tablet                                                       

            - take 1 tablet by ORAL route as directed for 5 days Day 1 - take two (2) tablets cp  

      one time.  Day 2, 3, 4 , 5 take one (1) tablet once daily.; 6 tablet; Refills: 0,           

      Product Selection Permitted                                                                 

        - Prednisone 20 mg Oral Tablet                                                             

            - take 2 tablets by ORAL route once daily for 5 days then take 1 tablet daily for cp  

      3 days, then 1/2 tablet daily for 2 days; 14 tablet; Refills: 0, Product Selection          

      Permitted                                                                                   

Signatures:                                                                                       

Dispatcher [employer]                           EDMS                                                 

Antonio Hayes PA PA   cp                                                   

Luis Manuel Stanley RN                       RN   jb4                                                  

Sana Cheng RN                    RN   vg1                                                  

Shena Beavers RN                        RN   sm5                                                  

                                                                                                  

Corrections: (The following items were deleted from the chart)                                    

22:16 22:14 Bronchitis, not specified as acute or chronic cp                                  cp  

                                                                                                  

**************************************************************************************************

## 2022-05-13 NOTE — RAD REPORT
EXAM DESCRIPTION:  RAD - Chest Single View - 5/13/2022 7:42 pm

 

CLINICAL HISTORY:  CHEST PAIN

Chest pain.

 

COMPARISON:  CHEST PA AND LAT 2 VIEW dated 1/10/2011

 

FINDINGS:  Portable technique limits examination quality.

 

The lungs are grossly clear. The heart is normal in size. No displaced fractures.

 

IMPRESSION:  No acute intrathoracic process suspected.

## 2022-05-13 NOTE — XMS REPORT
Continuity of Care Document

                             Created on:May 13, 2022



Patient:LEANDER SHAW

Sex:Male

:1965

External Reference #:[id number]





Demographics







                          Address                   [address] [place] [address]

 

                          Home Phone                [phone]

 

                          Work Phone                [phone]

 

                          Mobile Phone              [phone]

 

                          Email Address             [email or web address]

 

                          Preferred Language        English

 

                          Marital Status            Unknown

 

                          Lutheran Affiliation     Unknown

 

                          Race                      Unknown

 

                          Additional Race(s)        Unavailable



                                                    Unavailable



                                                    White

 

                          Ethnic Group              Unknown









Author







                          Organization              [facility]

t

 

                          Address                   [address]



                                                    [place] [address]

 

                          Phone                     [phone]









Support







                Name            Relationship    Address         Phone

 

                ROMAN SHAW               Unavailable     Unavailable

 

                Raoul         Designated Contact Unavailable     [phone]

[phone]









Care Team Providers







                    Name                Role                Phone

 

                    ALICE JIMENEZ     Primary Care Physician Unavailable

 

                    SYSTEM,  NOT IN     Attending Clinician Unavailable

 

                    YAMILET              Attending Clinician Unavailable

 

                    YAMILET PERDUE Attending Clinician Unavailable

 

                    Yamilet STONE           Attending Clinician [phone]

 

                    Only,  Db Test      Attending Clinician Unavailable

 

                    SOCORRO Romero      Attending Clinician [phone]

 

                    SOCORRO CORTÉS          Attending Clinician Unavailable

 

                    MEREDITH PICKENS        Attending Clinician Unavailable

 

                    VARUN SCHULTZ          Attending Clinician Unavailable

 

                    YAMILET PERDUE Admitting Clinician Unavailable









Payers







           Payer Name Policy Type Policy Number Effective Date Expiration Date S

ource

 

           PPO/EPO - BCBS            [id number]                       

 

           BCBS PPO POS EPO            [id number] 2015 00:00:00         

   



           CHOICE                                                 

 

           [place] POS            [id number] 2015 00:00:00          

  







Problems

This patient has no known problems.



Allergies, Adverse Reactions, Alerts







       Allergy Allergy Status Severity Reaction(s) Onset  Inactive Treating Comm

ents 

Source



       Name   Type                        Date   Date   Clinician        

 

       Acetylsa Propensi Active        Swelling                       [facility]

[facility]



       licylic ty to                       [address]                        [facility]



       Acid   adverse                      00:00:                      of



              reaction                      00                          [facility]icin



              s to                                                    e



              substanc                                                  



              e                                                       

 

       PENICILL Allergy Active High   Anaphylaxis                       [facility]

[facility]



       [facility]                                [facility]-                        [facility]



                                          00:00:                      [facility]



                                          00                          Center

 

       Penicill Propensi Active        Anaphylaxis                       [facility]

[facility]



       ins    ty to                       2                        [facility]



              adverse                      00:00:                      of



              reaction                      00                          Medicin



              s to                                                    e



              drug                                                    

 

       ASPIRIN Allergy Active High   Anaphylaxis                       [facility]

 St



                                                                  [facility]



                                          00:00[facility]                      [facility]



                                          00                          Center

 

       Aspirin Propensi Active        Anaphylaxis                       [facility]

[facility]



              ty to                                               [facility]



              adverse                      00:00:                      of



              reaction                      00                          Medicin



              s to                                                    e



              drug                                                    

 

       NO KNOWN Drug   Active                                           [facility]



       ALLERGIE Class                                                   ity of



       S                                                              [facility]







Social History







           Social Habit Start Date Stop Date  Quantity   Comments   Source

 

           Tobacco use and 2022 Smokeless tobacco            [facility]

[facility]



           exposure   00:00:00   00:00:00   non-user              of Medicine

 

           Alcohol intake 2022 Ex-drinker            [facility]

lege



                      00:00:00   00:00:00   (finding)             of Medicine

 

           Exposure to 2022 Not sure              [facility] Dante ovalles



           SARS-CoV-2 00:00:00   11:43:00                         of Medicine



           (event)                                                

 

           Sex Assigned At [birth year]                       [facility]

llege



           Birth      00:00:00   00:00:00                         of Medicine









                Smoking Status  Start Date      Stop Date       Source

 

                Unknown if ever smoked                                 [facility]

[facility]

 

                Never smoked tobacco                                 [facility] L[facility]

ege of Medicine







Medications







       Ordered Filled Start  Stop   Current Ordering Indication Dosage Frequency

 Signature

                    Comments            Components          Source



     Medication Medication Date Date Medication? Clinician                (SIG) 

          



     Name Name                                                   

 

     DEXILANT 60            Yes            60mg      Take 60 mg           

Ritchie



     MG CPDR      3-16                               by mouth           [facility]



               00:00:                               daily.           of



               00                                                Medicin



                                                                 e

 

     zolpidem            Yes            12.5mg      Take 12.5           [facility]

ylor



     (AMBIEN CR)      1-26                               mg by           [facility]



     12.5 MG CR      00:00:                               mouth at           of



     tablet      00                                 bedtime.           [facility]



                                                                 e

 

     montelukast            Yes            10mg      Take 10 mg           

[facility]



     (SINGULAIR)      1-24                               by mouth           [facility]

ege



     10 MG      00:00:                               daily.           of



     tablet      00                                                [facility]icin



                                                                 e

 

     No known            No                                      [facility]



     medications      1-21                                              ity of



               19:52:                                              [address]



                                                                 Branch

 

     doxycycline            Yes            100mg      Take 100           [facility]

[facility]



     (VIBRAMYCIN      1-11                               mg by           [facility]



     ) 100 MG      00:00:                               mouth           of



     capsule      00                                 daily.           [facility]



                                                                 e

 

     DULERA            Yes            2{puff}      Take 2           [facility]



     200-5      1-11                               Puffs by           [facility]



     MCG/ACT      00:00:                               mouth two           of



     AERO      00                                 times           [facility]icin



                                                  daily.           e

 

     fexofenadin            Yes            180mg      Take 180           [facility]

[facility]



     e (ALLEGRA)      1-01                               mg by           [facility]



     180 MG      00:00:                               mouth           of



     tablet      00                                 daily.           [facility]



                                                                 e

 

     losartan            Yes            100mg      Take 100           [facility]

or



     (COZAAR)      1-01                               mg by           [facility]



     100 MG      00:00:                               mouth           of



     tablet      00                                 daily.           [facility]



                                                                 e







Immunizations







           Ordered    Filled Immunization Date       Status     Comments   Sour

e



           Immunization Name Name                                        

 

           SARS-COV-2 COVID-19            2021-04-10 Completed             [facility]

rsity of



           PFIZER VACCINE            00:00:00                         [facility]

 

           SARS-COV-2 COVID-19            2021 Completed             [facility]

rsity of



           PFIZER VACCINE            00:00:00                         [place] [facility]

[facility]



                                                                  Branch







Vital Signs







             Vital Name   Observation Time Observation Value Comments     Source

 

             WEIGHT       2022 06:52:00 97.1 kg                   

 

             HEIGHT       2022 06:52:00 188 cm                    

 

             HEIGHT       2022 14:36:00 188 cm                    

 

             WEIGHT       2022 14:36:00 99.338 kg                 

 

             Systolic blood 2022 16:15:00 124 mm[Hg]                [facility]                                            Medicine

 

             Diastolic blood 2022 16:15:00 72 mm[Hg]                 [facility]                                            Medicine

 

             Respiratory rate 2022 16:15:00 16 /min                   [facility]

 

             Body height  2022 16:15:00 188 cm                    [facility]

 

             Body weight  2022 16:15:00 99.701 kg                 [facility]

 

             BMI          2022 16:15:00 28.22 kg/m2               [facility]







Procedures

This patient has no known procedures.



Plan of Care







             Planned Activity Planned Date Details      Comments     Source

 

             Future Scheduled 2022   Screening for malignant              

[facility]



             Test         09:10:56     neoplasm of colon              of Medicin

e



                                       (procedure) [code =              



                                       229051147]                

 

             Future Scheduled 2022   TETANUS SHOT (ADULT)              [facility]



             Test         09:10:56     [code = TETANUS SHOT              of Medi

cine



                                       (ADULT)]                  

 

             Future Scheduled 2022   BMI FOLLOW UP PLAN              [facility]

[facility]



             Test         09:10:56     [code = BMI FOLLOW UP              of Med

icine



                                       PLAN]                     

 

             Future Scheduled 2022   Hepatitis C screening              [facility]

[facility]



             Test         09:10:56     (procedure) [code =              of Medic

ine



                                       [id number]]                

 

             Future Scheduled 2022   Human immunodeficiency              B

[facility]



             Test         09:10:56     virus screening              of Medicine



                                       (procedure) [code =              



                                       312286097]                

 

             Future Scheduled 2022   ZOSTER VACCINE (1 of 2)              

[facility]



             Test         09:10:56     [code = ZOSTER VACCINE              of Me

dicine



                                       (1 of 2)]                 

 

             Future Scheduled 2022   FLU VACCINE > 6 MONTHS              B

[facility]



             Test         09:10:56     [code = FLU VACCINE > 6              of M

edicine



                                       MONTHS]                   

 

             Future Scheduled 2022   EUS WITH MAC, UPPER 1 Occurrences [facility]



             Test         11:29:15     WITH FNA [code = NOCPT] starting     of M

edicine



                                                    2022 until 



                                                    10/08/2022   







Encounters







        Start   End     Encounter Admission Attending Care    Care    Encounter 

Source



        Date/Time Date/Time Type    Type    Clinicians Facility Department ID   

   

 

        2021         Outpatient         SYSTEM, REGGIE PAREDES     [id number]

 MD



        08:48:24                         PROVIDER                         Luis carson

 

        2020         Outpatient         SYSTEM, REGGIE PAREDES     [id number]

 MD



        11:14:00                         PROVIDER                         Luis carson

 

        2022 Outpatient         HECTOR PERDUE     [id number]

6 [facility]



        14:47:32 14:48:56                 CODY ovalles



                                                                        of



                                                                        Medicin



                                                                        e

 

        2022 Outpatient WALLACE SIERRA    Surgery [id number]

125 [facility]



        06:47:00 11:15:00                 [facility]                         

 

        2022 Outpatient                 [facility]    [id number]

[id number] [facility]



        00:00:00 00:00:00                                                 

 

        2022 Office          HECTOR Perdue     [id number]

18 [facility]



        12:00:00 15:10:27 Visit           [facility] AMBULATOR [id number]         

College



                                                Y       [id number]         of



                                                        [id number]         Medi

rhea



                                                        325             e

 

        2022 Laboratory         Only, Ang Db Test [facility]    1.2.8

40.114 [id number] 

Univers



        20:00:00 20:15:00 Only            Hubert Cortés [facility]  [id number]

         [place]



                                                [place] [id number]         Edmund

as



                                                KRISTINE?BLEA [id number]         [address]



                                                MEDICAL                 



                                                OFFICE                  



                                                BUILDING                 

 

        2022 Outpatient R       MOO [facility]    [id number]

[id number] [facility]



        20:00:00 19:57:03                 HUBERT pierre o

f



                                                                        [facility]

 

        2021-04-10 2021-04-10 [facility]    [id number]

[id number] [facility]



        16:25:00 10:48:46                 TRACEE pierre [facility]

 

        2021 Outpatient NASH JOHNSON     107

[id number] MD



        08:59:17 08:59:17                                                 Luis carson

 

        2021 Outpatient NASH JOHNSON     107

[id number] MD



        00:00:00 00:00:00                                                 Luis carson

 

        2021 Outpatient KRISTY PICKENS [facility]    [id number]

[id number] [facility]



        16:25:00 16:25:00                 TRACEE pierre [facility]

 

        2021 Outpatient NASH JOHNSON     107

[id number] MD



        00:00:00 00:00:00                                                 Luis carson







Results

This patient has no known results.

## 2022-05-13 NOTE — RAD REPORT
EXAM DESCRIPTION:  US - Abdomen Exam Limited - 5/13/2022 9:34 pm

 

CLINICAL HISTORY:  elevated liver enzymes

 

COMPARISON:  No comparisons

 

FINDINGS:  The gallbladder demonstrates no gallstones. No pericholecystic fluid or gallbladder wall t
hickening. The common bile duct is normal measuring 3 mm.

 

The liver demonstrates no findings of intrahepatic biliary dilatation.

 

IMPRESSION:  Unremarkable examination.

## 2022-05-13 NOTE — ER
Nurse's Notes                                                                                     

 [facility]                                                                 

Name: Anoop Silveira Jr                                                                           

Age: 56 yrs                                                                                       

Sex: Male                                                                                         

: 1965                                                                                   

MRN: [id number]                                                                                   

Arrival Date: 2022                                                                          

Time: 18:56                                                                                       

Account#: [id number]                                                                            

Bed 15                                                                                            

Private MD:                                                                                       

Diagnosis: Acute bronchitis, unspecified                                                          

                                                                                                  

Presentation:                                                                                     

                                                                                             

19:03 Chief complaint: Patient states: Cough x 2weeks and SOB with nausea; stated " I think I vg1 

      may have bronchitis". Coronavirus screen: Vaccine status: Patient reports receiving the     

      2nd dose of the covid vaccine. Client denies travel out of the [place] in the last 14          

      days. Ebola Screen: Patient denies exposure to infectious person. Patient denies travel     

      to an Ebola-affected area in the 21 days before illness onset. Initial Sepsis Screen:       

      Does the patient meet any 2 criteria? RR > 20 per min. HR > 90 bpm. Yes Does the            

      patient have a suspected source of infection? No. Patient's initial sepsis screen is        

      negative. Risk Assessment: Do you want to hurt yourself or someone else? Patient            

      reports no desire to harm self or others. Onset of symptoms was 2022.             

19:03 Method Of Arrival: Ambulatory                                                           vg1 

19:03 Acuity: RODRIGUEZ 3                                                                           vg1 

                                                                                                  

Triage Assessment:                                                                                

19:05 General: Appears uncomfortable, Behavior is calm, cooperative. Pain: Complains of pain  vg1 

      in chest Pain currently is 3 out of 10 on a pain scale. Pain began x 2 weeks.               

      Respiratory: Reports shortness of breath at rest on exertion cough that is productive,      

      Onset: The symptoms/episode began/occurred x 2 weeks, the patient has moderate              

      shortness of breath. Derm: Skin is intact, Skin is pale.                                    

                                                                                                  

Historical:                                                                                       

- Allergies:                                                                                      

19:05 Aspirin;                                                                                vg1 

19:05 PENICILLINS;                                                                            vg1 

- Home Meds:                                                                                      

19:05 losartan 25 mg Oral tab 1 tab once daily [Active]; Ambian [Active];                     vg1 

- PMHx:                                                                                           

19:05 Asthma; Hypertension; Bronchitis;                                                       vg1 

- PSHx:                                                                                           

19:05 left index finger partial amputation;                                                   vg1 

                                                                                                  

- Immunization history:: Client reports receiving the 2nd dose of the Covid vaccine.              

- Social history:: Smoking status: Patient denies any tobacco usage or history of.                

                                                                                                  

                                                                                                  

Screenin:10 Abuse screen: Denies threats or abuse. Nutritional screening: No deficits noted.        jb4 

      Tuberculosis screening: No symptoms or risk factors identified. Fall Risk None              

      identified.                                                                                 

                                                                                                  

Assessment:                                                                                       

19:10 General: Appears in no apparent distress. uncomfortable, Behavior is calm, cooperative, jb4 

      appropriate for age. Pain: Complains of pain in back Pain does not radiate. Pain            

      currently is 3 out of 10 on a pain scale. Neuro: Level of Consciousness is awake,           

      alert, obeys commands, Oriented to person, place, time, situation. Cardiovascular:          

      Patient's skin is warm and dry. Rhythm is sinus tachycardia. Respiratory: Airway is         

      patent Respiratory effort is even, unlabored, Respiratory pattern is regular,               

      symmetrical. GI: No signs and/or symptoms were reported involving the gastrointestinal      

      system. : No signs and/or symptoms were reported regarding the genitourinary system.      

      EENT: No signs and/or symptoms were reported regarding the EENT system. Derm: Skin is       

      intact, Skin is pink, warm \T\ dry. Musculoskeletal: Circulation, motion, and sensation     

      intact. Range of motion: intact in all extremities.                                         

20:30 Reassessment: Patient appears in no apparent distress at this time. Patient and/or      jb4 

      family updated on plan of care and expected duration. Pain level reassessed. Patient is     

      alert, oriented x 3, equal unlabored respirations, skin warm/dry/pink.                      

21:49 Reassessment: Patient appears in no apparent distress at this time. Patient and/or      jb4 

      family updated on plan of care and expected duration. Pain level reassessed. Patient is     

      alert, oriented x 3, equal unlabored respirations, skin warm/dry/pink.                      

22:40 Reassessment: Patient appears in no apparent distress at this time. Patient and/or      jb4 

      family updated on plan of care and expected duration. Pain level reassessed. Patient is     

      alert, oriented x 3, equal unlabored respirations, skin warm/dry/pink.                      

                                                                                                  

Vital Signs:                                                                                      

19:03  / 81; Pulse 120; Resp 24; Temp 98.5(O); Pulse Ox 98% on R/A; Weight 99.79 kg;    vg1 

      Height 6 ft. 2 in. (187.96 cm); Pain 3/10;                                                  

20:00  / 79; Pulse 101; Resp 20; Pulse Ox 99% on R/A;                                   jb4 

21:15  / 67; Pulse 111; Resp 19; Pulse Ox 96% on R/A;                                   jb4 

19:03 Body Mass Index 28.25 (99.79 kg, 187.96 cm)                                             vg1 

                                                                                                  

ED Course:                                                                                        

18:56 Patient arrived in ED.                                                                  as  

19:05 Triage completed.                                                                       vg1 

19:05 Arm band placed on.                                                                     vg1 

19:09 Antonio Hayes PA is PHCP.                                                                cp  

19:09 Rod Ascencio MD is Attending Physician.                                              cp  

19:10 Patient has correct armband on for positive identification. Bed in low position. Call   jb4 

      light in reach. Side rails up X 1.                                                          

19:29 Luis Manuel Stanley, RN is Primary Nurse.                                                     jb4 

19:44 XRAY Chest (1 view) In Process Unspecified.                                             EDMS

20:11 Influenza Screen (a \T\ B) Sent.                                                          jb4

20:11 COVID-19 SARS RT PCR (Document "Date of Onset" if Symptomatic) Sent.                    jb4 

20:11 Basic Metabolic Panel Sent.                                                             jb4 

20:11 CBC with Diff Sent.                                                                     jb4 

20:11 D-Dimer Sent.                                                                           jb4 

20:11 LFT's Sent.                                                                             jb4 

20:11 Magnesium Sent.                                                                         jb4 

20:11 NT PRO-BNP Sent.                                                                        jb4 

20:12 PT-INR Sent.                                                                            jb4 

20:12 Troponin HS Sent.                                                                       jb4 

20:56 CT Chest For PE Angio In Process Unspecified.                                           EDMS

21:36 US Abdomen Limited In Process Unspecified.                                              EDMS

22:40 No provider procedures requiring assistance completed. IV discontinued, intact,         jb4 

      bleeding controlled, No redness/swelling at site. Pressure dressing applied.                

                                                                                                  

Administered Medications:                                                                         

19:55 Drug: Xopenex (levalbuterol) (3) 1.25 mg Route: Inhalation;                             jb4 

19:55 Drug: SOLU-Medrol (methylPrednisoLONE) 125 mg Route: IVP; Site: right wrist;            jb4 

21:00 Follow up: Response: No adverse reaction                                                jb4 

20:17 Drug: NS 0.9% 1000 ml Route: IV; Rate: 500 ml/hr; Site: right wrist;                    sm5 

22:17 Follow up: Response: No adverse reaction; IV Status: Completed infusion; IV Intake:     jb4 

      1000ml                                                                                      

20:17 Drug: Zofran (Ondansetron) 4 mg Route: IVP; Site: right wrist;                          sm5 

22:41 Follow up: Response: No adverse reaction                                                jb4 

21:17 Drug: Magnesium Sulfate 1 grams Route: IVPB; Infused Over: 1 hrs; Site: right forearm;  jb4 

22:17 Follow up: Response: No adverse reaction; IV Status: Completed infusion; IV Intake:     jb4 

      100ml                                                                                       

                                                                                                  

                                                                                                  

Intake:                                                                                           

22:17 IV: 100ml; Total: 100ml.                                                                jb4 

22:17 IV: 1000ml; Total: 1100ml.                                                              jb4 

                                                                                                  

Outcome:                                                                                          

22:14 Discharge ordered by MD.                                                                severino  

22:40 Discharged to home ambulatory, with family.                                             jb4 

22:40 Condition: stable                                                                           

22:40 Discharge instructions given to patient, Instructed on discharge instructions, follow       

      up and referral plans. medication usage, Demonstrated understanding of instructions,        

      follow-up care, medications, Prescriptions given X 3.                                       

22:42 Patient left the ED.                                                                    jb4 

                                                                                                  

Signatures:                                                                                       

Dispatcher [employer]                           Laila Hobbs Corey, PA PA cp Bryson, James RN                       RN   jb4                                                  

Sana Cheng RN                    RN   vg1                                                  

Shena Beavers RN                        RN   sm5                                                  

                                                                                                  

**************************************************************************************************

## 2022-05-14 VITALS — SYSTOLIC BLOOD PRESSURE: 118 MMHG | DIASTOLIC BLOOD PRESSURE: 67 MMHG | OXYGEN SATURATION: 96 %

## 2022-05-14 VITALS — TEMPERATURE: 98.5 F

## 2022-05-14 NOTE — EKG
Test Date:    2022-05-13               Test Time:    19:42:44

Technician:   MARILU                                     

                                                     

MEASUREMENT RESULTS:                                       

Intervals:                                           

Rate:         105                                    

PA:           132                                    

QRSD:         72                                     

QT:           328                                    

QTc:          433                                    

Axis:                                                

P:            34                                     

PA:           132                                    

QRS:          38                                     

T:            26                                     

                                                     

INTERPRETIVE STATEMENTS:                                       

                                                     

Sinus tachycardia

Otherwise normal ECG

No previous ECG available for comparison



Electronically Signed On 05-14-22 14:54:12 CDT by Domenico Flaherty